# Patient Record
Sex: FEMALE | Race: WHITE | HISPANIC OR LATINO | URBAN - METROPOLITAN AREA
[De-identification: names, ages, dates, MRNs, and addresses within clinical notes are randomized per-mention and may not be internally consistent; named-entity substitution may affect disease eponyms.]

---

## 2023-03-06 LAB
GLUCOSE, 1 HR SCREEN, PREG: 140 MG/DL
HEMATOCRIT (%) IN BLOOD BY AUTOMATED COUNT: 27.6 % (ref 36–46)
HEMOGLOBIN (G/DL) IN BLOOD: 8.2 G/DL (ref 12–16)

## 2023-04-10 LAB
GLUCOSE THREE HOUR: 61 MG/DL
GLUCOSE TWO HOUR: 109 MG/DL
GLUCOSE, FASTING: 73 MG/DL
GLUCOSE, ONE HOUR: 147 MG/DL
GTTCM: NORMAL

## 2023-04-26 LAB — URINE CULTURE: NO GROWTH

## 2023-05-12 LAB
HEMATOCRIT (%) IN BLOOD BY AUTOMATED COUNT: 33 % (ref 36–46)
HEMOGLOBIN (G/DL) IN BLOOD: 9.7 G/DL (ref 12–16)

## 2023-05-13 LAB — URINE CULTURE: NORMAL

## 2023-05-14 LAB — GROUP B STREP SCREEN: NORMAL

## 2023-05-19 ENCOUNTER — HOSPITAL ENCOUNTER (OUTPATIENT)
Dept: DATA CONVERSION | Facility: HOSPITAL | Age: 28
End: 2023-05-19
Attending: OBSTETRICS & GYNECOLOGY
Payer: COMMERCIAL

## 2023-05-19 DIAGNOSIS — O26.893 OTHER SPECIFIED PREGNANCY RELATED CONDITIONS, THIRD TRIMESTER (HHS-HCC): ICD-10-CM

## 2023-05-19 DIAGNOSIS — N89.8 OTHER SPECIFIED NONINFLAMMATORY DISORDERS OF VAGINA: ICD-10-CM

## 2023-05-19 DIAGNOSIS — Z3A.38 38 WEEKS GESTATION OF PREGNANCY (HHS-HCC): ICD-10-CM

## 2023-09-07 VITALS — WEIGHT: 125.22 LBS | HEIGHT: 61 IN | BODY MASS INDEX: 23.64 KG/M2

## 2023-09-30 NOTE — PROGRESS NOTES
Current Stage:   Stage: Triage     Subjective Data:   Antepartum:  Antepartum:    26 yo  at 38.2 weeks c/o leaking fluid this evening. +FM , no VB, no contractions.    PMH: none    PSH:     SH: negative    ALL: keflex      Objective Information:    Objective Information:      T   P  R  BP   MAP  SpO2   Value     78     124/74   93  82%  Date/Time    23:09    23:09   23:09  23:06  Range     (78 - 254 )    (124 - 124 )/ (74 - 74 )  (93 - 93 )  (82% - 82% )      Physical Exam:   Constitutional: alert, oriented   Obstetric: gravid, soft, nontender.  VE: cl/50/-1 posterior  SSE: no pooling, negative nitrazine  bedside ultrasound good AFV. vtx   ctxs: occasional  FHR: 120-130s with moderate variability and accels. Category I.   Extremities: nontender.   Psychological: appropriate affect     Assessment and Plan:   Assessment:    26 yo  at 38.2 weeks, GBS negative, no ROM  -no evidence of SROM  -f/u with Dr. Feliz this week as scheduled      Electronic Signatures:  Alyce Salvador)  (Signed 19-May-2023 23:55)   Authored: Current Stage, Subjective Data, Objective Data,  Assessment and Plan, Note Completion      Last Updated: 19-May-2023 23:55 by Alyce Salvador)

## 2023-10-18 PROBLEM — L72.9 CYST OF SKIN: Status: ACTIVE | Noted: 2023-10-18

## 2023-10-18 PROBLEM — N75.1 ABSCESS OF BARTHOLIN'S GLAND: Status: ACTIVE | Noted: 2023-10-18

## 2023-10-18 PROBLEM — O26.849: Status: ACTIVE | Noted: 2023-10-18

## 2023-10-18 PROBLEM — R10.2 VAGINAL PAIN: Status: ACTIVE | Noted: 2023-10-18

## 2023-10-18 PROBLEM — R82.998 URINE LEUKOCYTES: Status: ACTIVE | Noted: 2023-10-18

## 2023-10-18 PROBLEM — O99.019 ANEMIA IN PREGNANCY (HHS-HCC): Status: ACTIVE | Noted: 2023-10-18

## 2023-10-18 PROBLEM — N91.2 AMENORRHEA: Status: ACTIVE | Noted: 2023-10-18

## 2023-10-18 RX ORDER — FERROUS SULFATE 325(65) MG
1 TABLET, DELAYED RELEASE (ENTERIC COATED) ORAL DAILY
COMMUNITY
Start: 2022-12-20 | End: 2023-10-19 | Stop reason: ALTCHOICE

## 2023-10-18 RX ORDER — OXYCODONE AND ACETAMINOPHEN 5; 325 MG/1; MG/1
2 TABLET ORAL EVERY 6 HOURS PRN
COMMUNITY
End: 2023-10-19 | Stop reason: ALTCHOICE

## 2023-10-18 RX ORDER — IBUPROFEN 600 MG/1
600 TABLET ORAL EVERY 6 HOURS PRN
COMMUNITY
End: 2023-10-19 | Stop reason: ALTCHOICE

## 2023-10-18 RX ORDER — FERROUS SULFATE 325(65) MG
1 TABLET ORAL DAILY
COMMUNITY
Start: 2023-03-08 | End: 2023-04-07

## 2023-10-18 RX ORDER — DOCUSATE SODIUM 100 MG/1
100 CAPSULE, LIQUID FILLED ORAL 2 TIMES DAILY
COMMUNITY
End: 2023-10-19 | Stop reason: ALTCHOICE

## 2023-10-18 RX ORDER — PROMETHAZINE HYDROCHLORIDE 25 MG/1
25 TABLET ORAL EVERY 6 HOURS
COMMUNITY
End: 2023-10-19 | Stop reason: ALTCHOICE

## 2023-10-19 ENCOUNTER — OFFICE VISIT (OUTPATIENT)
Dept: OBSTETRICS AND GYNECOLOGY | Facility: CLINIC | Age: 28
End: 2023-10-19
Payer: COMMERCIAL

## 2023-10-19 VITALS
HEIGHT: 61 IN | WEIGHT: 108 LBS | SYSTOLIC BLOOD PRESSURE: 98 MMHG | DIASTOLIC BLOOD PRESSURE: 52 MMHG | BODY MASS INDEX: 20.39 KG/M2

## 2023-10-19 DIAGNOSIS — Z30.431 SURVEILLANCE OF PREVIOUSLY PRESCRIBED INTRAUTERINE CONTRACEPTIVE DEVICE: Primary | ICD-10-CM

## 2023-10-19 PROCEDURE — 99213 OFFICE O/P EST LOW 20 MIN: CPT | Performed by: MIDWIFE

## 2023-10-19 PROCEDURE — 1036F TOBACCO NON-USER: CPT | Performed by: MIDWIFE

## 2023-10-19 NOTE — PROGRESS NOTES
Subjective   Patient ID: Rachel Mcdaniel is a 28 y.o. female who presents for iud check. Pt concerned that Mirena may have fallen out on 10/10 as she thinks she saw it when she flushed toilet.   PMHx: Mirena placed 7/18/23  SocHx: , last IC 10/9/23  HPI    Review of Systems   All other systems reviewed and are negative.      Objective   Physical Exam  Genitourinary:     Vagina: Normal.      Uterus: Normal.       Comments: IUD strings visible 2cm long      Physical Exam  Vitals and nursing note reviewed.   Constitutional:       Appearance: Normal appearance.   HENT:     Head/Face: Normal  Eyes:      Pupils: Pupils are equal, round, and reactive to light.   Pulmonary:      Effort: Pulmonary effort is normal.     Abdominal:      Palpations: Abdomen is soft. There is no mass.      Tenderness: There is no abdominal tenderness.   Musculoskeletal:         General: Normal range of motion.   Lymphadenopathy:      Cervical: No cervical adenopathy.         General: Skin is warm and dry.   Neurological:      Mental Status: She is alert and oriented    Psychiatric:         Mood and Affect: Mood normal.     Assessment/Plan   Diagnoses and all orders for this visit:  Surveillance of previously prescribed intrauterine contraceptive device    Reassurance given re exam findings.  I advised pt on how to check for strings of IUD at home.  RTO AE/prn

## 2024-08-02 ENCOUNTER — LAB (OUTPATIENT)
Dept: LAB | Facility: LAB | Age: 29
End: 2024-08-02
Payer: COMMERCIAL

## 2024-08-02 LAB — COTININE UR QL SCN: NEGATIVE

## 2025-05-01 ENCOUNTER — PROCEDURE VISIT (OUTPATIENT)
Dept: OBSTETRICS AND GYNECOLOGY | Facility: CLINIC | Age: 30
End: 2025-05-01

## 2025-05-01 VITALS
DIASTOLIC BLOOD PRESSURE: 70 MMHG | SYSTOLIC BLOOD PRESSURE: 102 MMHG | HEIGHT: 61 IN | BODY MASS INDEX: 22.47 KG/M2 | WEIGHT: 119 LBS

## 2025-05-01 DIAGNOSIS — Z30.432 ENCOUNTER FOR REMOVAL OF INTRAUTERINE CONTRACEPTIVE DEVICE: Primary | ICD-10-CM

## 2025-05-01 DIAGNOSIS — N75.1 ABSCESS OF BARTHOLIN'S GLAND: ICD-10-CM

## 2025-05-01 NOTE — PROGRESS NOTES
Subjective   Patient ID: Rachel Mcdaniel is a 30 y.o. female who presents for No chief complaint on file.. Removal of Mirena d/t off/on spotting/bleeding since IUD use.  Pt also has h/o recurrent Bartholin gland cysts and has this on right again.  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  SocHx: -- with  12 yrs  ROS: denies ACHES, no pelvic pain, no urinary c/o, NAD  Review of Systems    Objective   Physical Exam  Constitutional:       Appearance: Normal appearance. She is normal weight.   HENT:      Head: Normocephalic.   Pulmonary:      Effort: Pulmonary effort is normal.   Genitourinary:     Exam position: Lithotomy position.      Vagina: Normal.      Cervix: Normal.          Comments: Right Bartholin gland cyst that is NT and the size of a walnut.  Lymphadenopathy:      Lower Body: No right inguinal adenopathy. No left inguinal adenopathy.   Neurological:      Mental Status: She is alert.   Psychiatric:         Behavior: Behavior normal.         Thought Content: Thought content normal.     Patient ID: Rachel Mcdaniel is a 30 y.o. female.    IUD Management    Performed by: OWEN Kwok ND  Authorized by: OWEN Kwok ND    Procedure: IUD removal    Consent obtained by patient, parent, or legal power of  - including discussion of procedure risks and benefits, patient questions answered, and patient education provided: yes    Reason for removal: patient request    Strings visualized: yes    Tenaculum applied to cervix: no    Cervix manually dilated: no    IUD grasped by forceps: yes    Performed with ultrasound guidance: no    IUD removed: yes    Date/Time of Removal:  2025 11:07 AM  Removed without complications: yes    IUD intact: yes        Assessment/Plan   Diagnoses and all orders for this visit:  Encounter for removal of intrauterine contraceptive  device  Abscess of Bartholin's gland  Other orders  -     IUD Management    Exam findings and palliative measures for Bartholin gland cyst discussed.  If this becomes painful, then RTO is advised for abx tx and evaluation by Dr Feliz as pt says this has been recurrent.  We also discussed BCM-- pt declined BCM at this time  RTO AE/PRN       JUANCHO Kwok-ALEC, ND 05/01/25 10:39 AM